# Patient Record
Sex: FEMALE | Race: WHITE | NOT HISPANIC OR LATINO | URBAN - METROPOLITAN AREA
[De-identification: names, ages, dates, MRNs, and addresses within clinical notes are randomized per-mention and may not be internally consistent; named-entity substitution may affect disease eponyms.]

---

## 2021-09-12 ENCOUNTER — INPATIENT (INPATIENT)
Facility: HOSPITAL | Age: 81
LOS: 0 days | Discharge: HOME | DRG: 641 | End: 2021-09-13
Attending: INTERNAL MEDICINE | Admitting: INTERNAL MEDICINE
Payer: COMMERCIAL

## 2021-09-12 VITALS
SYSTOLIC BLOOD PRESSURE: 110 MMHG | RESPIRATION RATE: 17 BRPM | OXYGEN SATURATION: 98 % | WEIGHT: 99.21 LBS | DIASTOLIC BLOOD PRESSURE: 44 MMHG | HEART RATE: 137 BPM

## 2021-09-12 DIAGNOSIS — R55 SYNCOPE AND COLLAPSE: ICD-10-CM

## 2021-09-12 DIAGNOSIS — Z29.9 ENCOUNTER FOR PROPHYLACTIC MEASURES, UNSPECIFIED: ICD-10-CM

## 2021-09-12 DIAGNOSIS — D47.3 ESSENTIAL (HEMORRHAGIC) THROMBOCYTHEMIA: ICD-10-CM

## 2021-09-12 DIAGNOSIS — D45 POLYCYTHEMIA VERA: ICD-10-CM

## 2021-09-12 DIAGNOSIS — E86.1 HYPOVOLEMIA: ICD-10-CM

## 2021-09-12 DIAGNOSIS — I10 ESSENTIAL (PRIMARY) HYPERTENSION: ICD-10-CM

## 2021-09-12 DIAGNOSIS — E78.5 HYPERLIPIDEMIA, UNSPECIFIED: ICD-10-CM

## 2021-09-12 DIAGNOSIS — I25.10 ATHEROSCLEROTIC HEART DISEASE OF NATIVE CORONARY ARTERY WITHOUT ANGINA PECTORIS: ICD-10-CM

## 2021-09-12 DIAGNOSIS — Z79.82 LONG TERM (CURRENT) USE OF ASPIRIN: ICD-10-CM

## 2021-09-12 DIAGNOSIS — I95.89 OTHER HYPOTENSION: ICD-10-CM

## 2021-09-12 LAB
ALBUMIN SERPL ELPH-MCNC: 4 G/DL — SIGNIFICANT CHANGE UP (ref 3.3–5)
ALP SERPL-CCNC: 112 U/L — SIGNIFICANT CHANGE UP (ref 40–120)
ALT FLD-CCNC: 10 U/L — SIGNIFICANT CHANGE UP (ref 10–45)
ANION GAP SERPL CALC-SCNC: 15 MMOL/L — SIGNIFICANT CHANGE UP (ref 5–17)
ANISOCYTOSIS BLD QL: SIGNIFICANT CHANGE UP
APPEARANCE UR: CLEAR — SIGNIFICANT CHANGE UP
APTT BLD: 26.9 SEC — LOW (ref 27.5–35.5)
AST SERPL-CCNC: 18 U/L — SIGNIFICANT CHANGE UP (ref 10–40)
BACTERIA # UR AUTO: PRESENT /HPF
BASOPHILS # BLD AUTO: 0.45 K/UL — HIGH (ref 0–0.2)
BASOPHILS NFR BLD AUTO: 1.8 % — SIGNIFICANT CHANGE UP (ref 0–2)
BILIRUB SERPL-MCNC: 0.8 MG/DL — SIGNIFICANT CHANGE UP (ref 0.2–1.2)
BILIRUB UR-MCNC: NEGATIVE — SIGNIFICANT CHANGE UP
BLD GP AB SCN SERPL QL: NEGATIVE — SIGNIFICANT CHANGE UP
BUN SERPL-MCNC: 29 MG/DL — HIGH (ref 7–23)
CALCIUM SERPL-MCNC: 8.9 MG/DL — SIGNIFICANT CHANGE UP (ref 8.4–10.5)
CHLORIDE SERPL-SCNC: 98 MMOL/L — SIGNIFICANT CHANGE UP (ref 96–108)
CK MB CFR SERPL CALC: 4.2 NG/ML — SIGNIFICANT CHANGE UP (ref 0–6.7)
CK SERPL-CCNC: 52 U/L — SIGNIFICANT CHANGE UP (ref 25–170)
CK SERPL-CCNC: 55 U/L — SIGNIFICANT CHANGE UP (ref 25–170)
CK SERPL-CCNC: 59 U/L — SIGNIFICANT CHANGE UP (ref 25–170)
CO2 SERPL-SCNC: 25 MMOL/L — SIGNIFICANT CHANGE UP (ref 22–31)
COLOR SPEC: YELLOW — SIGNIFICANT CHANGE UP
COMMENT - URINE: SIGNIFICANT CHANGE UP
CREAT SERPL-MCNC: 1.09 MG/DL — SIGNIFICANT CHANGE UP (ref 0.5–1.3)
DIFF PNL FLD: NEGATIVE — SIGNIFICANT CHANGE UP
ELLIPTOCYTES BLD QL SMEAR: SLIGHT — SIGNIFICANT CHANGE UP
EOSINOPHIL # BLD AUTO: 0 K/UL — SIGNIFICANT CHANGE UP (ref 0–0.5)
EOSINOPHIL NFR BLD AUTO: 0 % — SIGNIFICANT CHANGE UP (ref 0–6)
EPI CELLS # UR: SIGNIFICANT CHANGE UP /HPF (ref 0–5)
GIANT PLATELETS BLD QL SMEAR: PRESENT — SIGNIFICANT CHANGE UP
GLUCOSE SERPL-MCNC: 164 MG/DL — HIGH (ref 70–99)
GLUCOSE UR QL: NEGATIVE — SIGNIFICANT CHANGE UP
HCT VFR BLD CALC: 43.6 % — SIGNIFICANT CHANGE UP (ref 34.5–45)
HGB BLD-MCNC: 12.4 G/DL — SIGNIFICANT CHANGE UP (ref 11.5–15.5)
INR BLD: 1.08 — SIGNIFICANT CHANGE UP (ref 0.88–1.16)
KETONES UR-MCNC: NEGATIVE — SIGNIFICANT CHANGE UP
LEUKOCYTE ESTERASE UR-ACNC: ABNORMAL
LYMPHOCYTES # BLD AUTO: 2.47 K/UL — SIGNIFICANT CHANGE UP (ref 1–3.3)
LYMPHOCYTES # BLD AUTO: 9.8 % — LOW (ref 13–44)
MANUAL SMEAR VERIFICATION: SIGNIFICANT CHANGE UP
MCHC RBC-ENTMCNC: 19.5 PG — LOW (ref 27–34)
MCHC RBC-ENTMCNC: 28.4 GM/DL — LOW (ref 32–36)
MCV RBC AUTO: 68.6 FL — LOW (ref 80–100)
METAMYELOCYTES # FLD: 0.9 % — HIGH (ref 0–0)
MICROCYTES BLD QL: SIGNIFICANT CHANGE UP
MONOCYTES # BLD AUTO: 0.23 K/UL — SIGNIFICANT CHANGE UP (ref 0–0.9)
MONOCYTES NFR BLD AUTO: 0.9 % — LOW (ref 2–14)
NEUTROPHILS # BLD AUTO: 21.4 K/UL — HIGH (ref 1.8–7.4)
NEUTROPHILS NFR BLD AUTO: 82.1 % — HIGH (ref 43–77)
NEUTS BAND # BLD: 2.7 % — SIGNIFICANT CHANGE UP (ref 0–8)
NITRITE UR-MCNC: NEGATIVE — SIGNIFICANT CHANGE UP
NT-PROBNP SERPL-SCNC: 305 PG/ML — HIGH (ref 0–300)
OVALOCYTES BLD QL SMEAR: SLIGHT — SIGNIFICANT CHANGE UP
PH UR: 6.5 — SIGNIFICANT CHANGE UP (ref 5–8)
PLAT MORPH BLD: ABNORMAL
PLATELET # BLD AUTO: 1064 K/UL — CRITICAL HIGH (ref 150–400)
PLATELET CLUMP BLD QL SMEAR: SLIGHT
POIKILOCYTOSIS BLD QL AUTO: SLIGHT — SIGNIFICANT CHANGE UP
POTASSIUM SERPL-MCNC: 3.3 MMOL/L — LOW (ref 3.5–5.3)
POTASSIUM SERPL-SCNC: 3.3 MMOL/L — LOW (ref 3.5–5.3)
PROT SERPL-MCNC: 6.4 G/DL — SIGNIFICANT CHANGE UP (ref 6–8.3)
PROT UR-MCNC: NEGATIVE MG/DL — SIGNIFICANT CHANGE UP
PROTHROM AB SERPL-ACNC: 12.9 SEC — SIGNIFICANT CHANGE UP (ref 10.6–13.6)
RBC # BLD: 6.36 M/UL — HIGH (ref 3.8–5.2)
RBC # FLD: 19.9 % — HIGH (ref 10.3–14.5)
RBC BLD AUTO: ABNORMAL
RBC CASTS # UR COMP ASSIST: < 5 /HPF — SIGNIFICANT CHANGE UP
RH IG SCN BLD-IMP: POSITIVE — SIGNIFICANT CHANGE UP
SARS-COV-2 RNA SPEC QL NAA+PROBE: NEGATIVE — SIGNIFICANT CHANGE UP
SMUDGE CELLS # BLD: PRESENT — SIGNIFICANT CHANGE UP
SODIUM SERPL-SCNC: 138 MMOL/L — SIGNIFICANT CHANGE UP (ref 135–145)
SP GR SPEC: 1.02 — SIGNIFICANT CHANGE UP (ref 1–1.03)
SPHEROCYTES BLD QL SMEAR: SIGNIFICANT CHANGE UP
TROPONIN T SERPL-MCNC: 0.01 NG/ML — SIGNIFICANT CHANGE UP (ref 0–0.01)
TROPONIN T SERPL-MCNC: 0.02 NG/ML — HIGH (ref 0–0.01)
UROBILINOGEN FLD QL: 0.2 E.U./DL — SIGNIFICANT CHANGE UP
VARIANT LYMPHS # BLD: 1.8 % — SIGNIFICANT CHANGE UP (ref 0–6)
WBC # BLD: 25.24 K/UL — HIGH (ref 3.8–10.5)
WBC # FLD AUTO: 25.24 K/UL — HIGH (ref 3.8–10.5)
WBC UR QL: > 10 /HPF

## 2021-09-12 PROCEDURE — 70450 CT HEAD/BRAIN W/O DYE: CPT | Mod: 26,ME

## 2021-09-12 PROCEDURE — 71045 X-RAY EXAM CHEST 1 VIEW: CPT | Mod: 26,59

## 2021-09-12 PROCEDURE — 93010 ELECTROCARDIOGRAM REPORT: CPT

## 2021-09-12 PROCEDURE — 71045 X-RAY EXAM CHEST 1 VIEW: CPT | Mod: 26

## 2021-09-12 PROCEDURE — 93308 TTE F-UP OR LMTD: CPT | Mod: 26

## 2021-09-12 PROCEDURE — G1004: CPT

## 2021-09-12 PROCEDURE — 99291 CRITICAL CARE FIRST HOUR: CPT | Mod: 25

## 2021-09-12 PROCEDURE — 93321 DOPPLER ECHO F-UP/LMTD STD: CPT | Mod: 26

## 2021-09-12 RX ORDER — EZETIMIBE 10 MG/1
1 TABLET ORAL
Qty: 0 | Refills: 0 | DISCHARGE

## 2021-09-12 RX ORDER — LISINOPRIL 2.5 MG/1
1 TABLET ORAL
Qty: 0 | Refills: 0 | DISCHARGE

## 2021-09-12 RX ORDER — SODIUM CHLORIDE 9 MG/ML
1000 INJECTION INTRAMUSCULAR; INTRAVENOUS; SUBCUTANEOUS ONCE
Refills: 0 | Status: COMPLETED | OUTPATIENT
Start: 2021-09-12 | End: 2021-09-12

## 2021-09-12 RX ORDER — HYDROXYUREA 500 MG/1
500 CAPSULE ORAL DAILY
Refills: 0 | Status: DISCONTINUED | OUTPATIENT
Start: 2021-09-12 | End: 2021-09-12

## 2021-09-12 RX ORDER — LISINOPRIL 2.5 MG/1
10 TABLET ORAL DAILY
Refills: 0 | Status: DISCONTINUED | OUTPATIENT
Start: 2021-09-12 | End: 2021-09-13

## 2021-09-12 RX ORDER — LISINOPRIL 2.5 MG/1
0 TABLET ORAL
Qty: 0 | Refills: 0 | DISCHARGE

## 2021-09-12 RX ORDER — HYDROXYUREA 500 MG/1
500 CAPSULE ORAL DAILY
Refills: 0 | Status: DISCONTINUED | OUTPATIENT
Start: 2021-09-12 | End: 2021-09-13

## 2021-09-12 RX ORDER — ASPIRIN/CALCIUM CARB/MAGNESIUM 324 MG
1 TABLET ORAL
Qty: 0 | Refills: 0 | DISCHARGE

## 2021-09-12 RX ORDER — HYDROCHLOROTHIAZIDE 25 MG
25 TABLET ORAL DAILY
Refills: 0 | Status: DISCONTINUED | OUTPATIENT
Start: 2021-09-12 | End: 2021-09-13

## 2021-09-12 RX ORDER — POTASSIUM CHLORIDE 20 MEQ
40 PACKET (EA) ORAL ONCE
Refills: 0 | Status: COMPLETED | OUTPATIENT
Start: 2021-09-12 | End: 2021-09-12

## 2021-09-12 RX ADMIN — Medication 40 MILLIEQUIVALENT(S): at 21:44

## 2021-09-12 RX ADMIN — SODIUM CHLORIDE 1000 MILLILITER(S): 9 INJECTION INTRAMUSCULAR; INTRAVENOUS; SUBCUTANEOUS at 16:14

## 2021-09-12 RX ADMIN — HYDROXYUREA 500 MILLIGRAM(S): 500 CAPSULE ORAL at 21:47

## 2021-09-12 NOTE — H&P ADULT - HISTORY OF PRESENT ILLNESS
A&O X4 Full Code    In terms of COVID-19;  pt. denies hx of COVID-19 and has received full, two doses, of the Pfizer COVID-19 vaccination.    82 y/o active female with PMHX of HTN, HLD and Polycythemia Vera diagnosed in 2019 at [facility], sees Hematologist, Dr. Nazario Hester, on Aspirin Ec 81mg PO daily, never phlebotomized or transfused, BIBA to [facility] ER this evening, 9/12/21, after witnessed presyncopal episode at bus stop with no LOC.     In speaking with patient, she reports being in her usual good state of health until this afternoon while walking to bus stop, after eating lunch, she felt lightheaded and weak.  Pt. attributed symptoms due to the humidity.       A&O X4 Full Code    In terms of COVID-19;  pt. denies hx of COVID-19 and has received full, two doses, of the Pfizer COVID-19 vaccination.    80 y/o active female with PMHX of HTN, HLD and Polycythemia Vera diagnosed in 2019 at [facility], sees Hematologist, Dr. Nazario Hester, on Aspirin Ec 81mg PO daily, never phlebotomized or transfused, BIBA to [facility] ER this evening, 9/12/21, after witnessed presyncopal episode at bus stop with no LOC.     In speaking with patient, she reports being in her usual good state of health until this afternoon while walking to bus stop, after eating lunch, she felt lightheaded and weak attributing her symptoms to the humidity.  According to patient, after arriving  at the bus stop,  she felt sudden  generalized weakness, palpitations and lightheadedness.  She  laid herself on the ground where she was helped by bystanders, never LOC. EMS was called, on arrival ECG performed by EMS revealing concerns for ST depression, SBP 70's in which patient received 300cc of fluids.  Pt. arrived to [facility] ER on a drip, dobutamine in which was discontinued.   On arrival to [facility] ER, ECG reveals no ST changes, NSR @99bpm with non specific ST-T wave changes and neg Troponin.   Pt. admits to similar near syncopal episode in 2019 when she was first diagnosed with Polycythemia Vera.   She reports seeing her Hematologist, Dr. Nazario Hester, affiliated with [facility] one month ago and as told he was concerned due to a significant rise in her platelets and he would need to start new medication on next visit, September 27, 2021, if there was not a decrease in platelets.  Pt. denies fever, chills, HA, chest pain, SOB, myalgia, dizziness, diaphoresis, palpitations, abdominal discomfort and n/v/d.    In ER ECG reveals NSR@99bpm with non specific ST-T wave changes, Troponin neg X1, WBC 25.4, Neutrophil 87.1, H/H 12.4/43.6, Platelets 1064, K+ 3.3 (replenished with Potassium Chloride 40mEq PO X1) and COVID-19 non detected.  CT of head without contrast reveals no acute hemorrhage or infarct.  Hematology was consulted, Dr. Garcia evaluated patient and started patient on Hydroxyurea 500mg PO daily, and recommended type and screen, no Aspirin 2/2 to extreme thrombocytosis, rule out acquired von Willebrand disease.  He also recommends contacting her Hematologist, Dr. Nazario Hester, for collateral information.    In light of patient's risk factors, prior ECG changes and near syncope pt. is admitted to [facility] for further monitoring.

## 2021-09-12 NOTE — ED ADULT NURSE REASSESSMENT NOTE - NS ED NURSE REASSESS COMMENT FT1
Patient requesting to drink ginger ale.  Confirmed with Dr. Dea AC for patient to drink at this time.  Ginger ale/crackers given, patient informed of plan for admission.  All questions answered.

## 2021-09-12 NOTE — H&P ADULT - NSICDXPASTMEDICALHX_GEN_ALL_CORE_FT
PAST MEDICAL HISTORY:  H/O polycythemia vera     Hyperlipidemia     Hypertension     Near syncope

## 2021-09-12 NOTE — H&P ADULT - PROBLEM SELECTOR PLAN 1
Telemetry, ECG, serial CE, Echocardiogram in AM.   Orthostatics.  F/U labs, UA, Type and Screen ordered, TSH, von Willebrand disease lab.  Pt. reports similar episode 2019 when she was first diagnosed with Polycythemia vera at [facility], never LOC.   NPO for cardiac workup, discuss with Dr. Bullard in AM.

## 2021-09-12 NOTE — ED ADULT TRIAGE NOTE - OTHER COMPLAINTS
S/P Syncopal episode. pt found to have low BP. arrives with dopamine infusing from ems. fs 180. pt diaphoretic.

## 2021-09-12 NOTE — ED PROVIDER NOTE - CRITICAL CARE ATTENDING CONTRIBUTION TO CARE
stat Cards consult- re STEMI on intial ekg  given thrombocytosis- stat d/w Heme- consider hydroxyurea

## 2021-09-12 NOTE — H&P ADULT - PROBLEM SELECTOR PLAN 2
Extreme Thrombocytosis, Platelet 1064, Hematology, Dr. Garcia, evaluated pt. in ER and started her on Hydroxyurea 500mg PO daily.  Dr. Garcia also recommends to keep active Type and Screen, f/u von Willebrand disease activity level, no aspirin.  Please call patient's Hematologist, Dr. Nazario Hester, affiliated with [facility] for collateral information.  Pt. has appointment with her Hematologist, Dr. Nazario Hester on September 27, 2021.  Monitor CBC.

## 2021-09-12 NOTE — CONSULT NOTE ADULT - ATTENDING COMMENTS
Patient seen and reviewed with fellow  Hx of MPD, diagnosed in 2019  Followed by Dr. Nazario Hester at [facility], last seen in 7/21  Has had phlebotomy in the past, last was in 2020  No recent phlebotomy and is currently microcytic hypochromic  Has been taking ASA daily  Now presents with syncope and platelet count >1 million  VW panel pending  Cardiac work up is ongoing  Has begun cytoreduction with hydrea 500mg  Will need records from [facility]    Sweta Man MD

## 2021-09-12 NOTE — H&P ADULT - NSHPPHYSICALEXAM_GEN_ALL_CORE
T(C): 36.5 (09-12-21 @ 16:10), Max: 36.5 (09-12-21 @ 16:10)  HR: 78 (09-12-21 @ 21:01) (78 - 137)  BP: 129/62 (09-12-21 @ 21:01) (99/48 - 152/67)  RR: 18 (09-12-21 @ 21:01) (16 - 19)  SpO2: 96% (09-12-21 @ 21:01) (88% - 99%)  Wt(kg): --    Appearance: Normal	  HEENT:   Normal oral mucosa, PERRL, EOMI	  Neck: Supple,  - JVD; No Carotid Bruit and 2+ pulses B/L  Cardiovascular: Normal S1 S2, No JVD, No murmurs  Respiratory: Lungs clear to auscultation/No Rales, Rhonchi, Wheezing	  Gastrointestinal:  Soft, Non-tender, + BS	  Skin: No rashes, No ecchymoses, No cyanosis  Extremities: Normal range of motion, No clubbing, cyanosis or edema  Vascular: Femoral pulses 2+ b/l without bruit, DP 2+ b/l, PT 1+ b/l  Neurologic: Non-focal  Psychiatry: A & O x 4, Mood & affect appropriate

## 2021-09-12 NOTE — CONSULT NOTE ADULT - ASSESSMENT
82 yo F with essential hypertension and polycythemia vera diagnosed in 2019 at [facility], sees Hematologist at [facility] (Dr. Gonzalez), on Aspirin 81 mg once daily, never phlebotomized or transfused, presents with witnessed presyncopal episode at a bus stop. She had last seen Dr. Gonzalez one month ago who told her that she only needed to continue with Aspirin. On presentation, her platelets was found to be in over 1000 K/uL. CT Head non-contrast was negative for hemorrhage or stroke. EKG was concerning for ST depression, with normal troponin. Hematology consulted for management of extreme thrombocytosis.    #) DIAGNOSIS  - Polycythemia vera [high risk features, age > 60, possible vasogenic event: cardiac]  - Extreme thrombocytosis, rule-out acquired von Willebrand disease    #) PLAN  - Hold off on Aspirin for now unless indicated by Cardiology team as extreme thrombocytosis (i.e. platelets > 1000K/uL) can paradoxically increase risk of bleeding due to acquired von Willebrand disease  - Send von Willebrand activity level  - Follow-up with Cardiology regarding abnormal EKG. Pending 2D Echo, orthostatic testing   - Start on Hydroxyurea 15 mg/kg (675 mg QD) for cytoreduction.  - Monitor CBC with differential once daily  - Maintain active T+S   - Primary team to obtain collateral from Hematologist (Dr. Gonzalez) at [facility] tomorrow. Will need last CBC and most recent Hematology note.     To discuss with Dr. Man   80 yo F with essential hypertension and polycythemia vera diagnosed in 2019 at [facility], sees Hematologist at [facility] (Dr. Gonzalez), on Aspirin 81 mg once daily, phlebotomized in 2019/2020, never transfused, presents with witnessed presyncopal episode at a bus stop. She had last seen Dr. Gonzalez one month ago who told her that she only needed to continue with Aspirin. On presentation, her platelets was found to be in over 1000 K/uL. CT Head non-contrast was negative for hemorrhage or stroke. EKG was concerning for ST depression, with normal troponin. Hematology consulted for management of extreme thrombocytosis.    #) DIAGNOSIS  - Polycythemia vera [high risk features, age > 60, possible vasogenic event: cardiac]  - Extreme thrombocytosis, rule-out acquired von Willebrand disease  - Basophilia with leukocytosis    #) PLAN  - Hold off on Aspirin for now unless indicated by Cardiology team as extreme thrombocytosis (i.e. platelets > 1000K/uL) can paradoxically increase risk of bleeding due to acquired von Willebrand disease  - Send von Willebrand activity level  - Follow-up with Cardiology regarding abnormal EKG. Pending 2D Echo, orthostatic testing   - Start on Hydroxyurea 15 mg/kg (500 mg QD) for cytoreduction.  - Monitor CBC with differential once daily  - Maintain active T+S   - Primary team to obtain collateral from Hematologist (Dr. Gonzalez) at [facility] tomorrow. Will need last CBC and most recent Hematology note.   - Send peripheral flow cytometry given unexplained leukocytosis and basophilia. Primary team to assess for infection.     Discussed with Dr. Man   80 yo F with essential hypertension and polycythemia vera diagnosed in 2019 at [facility], sees Hematologist at [facility] (Dr. Gonzalez), on Aspirin 81 mg once daily, phlebotomized in 2019/2020, never transfused, presents with witnessed presyncopal episode at a bus stop. She had last seen Dr. Gonzalez one month ago who told her that she only needed to continue with Aspirin. On presentation, her platelets was found to be in over 1000 K/uL. CT Head non-contrast was negative for hemorrhage or stroke. EKG was concerning for ST depression, with normal troponin. Hematology consulted for management of extreme thrombocytosis.    #) DIAGNOSIS  - Polycythemia vera [high risk features, age > 60, possible vasogenic event: cardiac]  - Extreme thrombocytosis, rule-out acquired von Willebrand disease  - Basophilia with leukocytosis    #) PLAN  - Hold off on Aspirin for now unless indicated by Cardiology team as extreme thrombocytosis (i.e. platelets > 1000K/uL) can paradoxically increase risk of bleeding due to acquired von Willebrand disease  - Send von Willebrand activity level  - Follow-up with Cardiology regarding abnormal EKG. Pending 2D Echo, orthostatic testing   - Start on Hydroxyurea 15 mg/kg (500 mg QD) for cytoreduction.  - Monitor CBC with differential once daily  - Maintain active T+S   - Primary team to obtain collateral from Hematologist (Dr. Nazario Hester) at [facility] tomorrow. Will need last CBC and most recent Hematology note.   - Send peripheral flow cytometry given unexplained leukocytosis and basophilia. Primary team to assess for infection.     Discussed with Dr. Man

## 2021-09-12 NOTE — ED ADULT NURSE REASSESSMENT NOTE - NS ED NURSE REASSESS COMMENT FT1
CXR done, patient reports "I feel very cold, but I feel a lot better than I was."  Warm blankets provided.

## 2021-09-12 NOTE — ED ADULT NURSE NOTE - OBJECTIVE STATEMENT
Patient arrives via EMS report stating bystanders witnessed patient have a syncopal episode on the sidewalk.  Per EMS patient found to be hypotensive 60s/40s, janett wrist IV access placed, patient given dopamine IV, upon arrival to ED patient awake and alert, evaluated by Dr. Malin.  STAT EKG completed, cardiac monitor applied, labs drawn and sent.  Patient eyes noted to be red.  Patient says she was out to lunch with friends, had omelette, toast, tea, and water.  Patient says she was walking home, had gone about 10 blocks and realized it was too humid to keep walking so she was waiting at the bus stop when she passed out.  No obvious injuries noted.  Patient states she takes baby ASA, lisinopril & HCTZ daily.  Denies chest pain or SOB now & prior to syncope.  Patient reports "this has never happened to me before."

## 2021-09-12 NOTE — H&P ADULT - NSHPOUTPATIENTPROVIDERS_GEN_ALL_CORE
Dr. Dom Mendoza [phone]; Hematologist, Dr. Nazario Hester Affiliated with [facility] [phone]  Pt. reports receiving all her medical care at [facility].

## 2021-09-12 NOTE — ED PROVIDER NOTE - CLINICAL SUMMARY MEDICAL DECISION MAKING FREE TEXT BOX
px hypotensive initially- w ekg concerning for STEMI_ stat cards consult- they will  admit- but no intervention - re elev plt's  - nml trop  repeat ekg- normalized- d/w Heme/Onc- may start hydroxyurea- P d/w their attg

## 2021-09-12 NOTE — H&P ADULT - ASSESSMENT
80 y/o active female with PMHX of HTN, HLD and Polycythemia Vera diagnosed in 2019 at [facility], sees Hematologist, Dr. Nazario Hester, on Aspirin Ec 81mg PO daily, never phlebotomized or transfused, BIBA to [facility] ER this evening, 9/12/21, after witnessed presyncopal episode at bus stop with no LOC.

## 2021-09-12 NOTE — CONSULT NOTE ADULT - SUBJECTIVE AND OBJECTIVE BOX
LENGTH OF HOSPITAL STAY: 0    CHIEF COMPLAINT: Syncope    HISTORY OF PRESENTING ILLNESS:   82 yo F with essential hypertension and polycythemia vera diagnosed in 2019 at [facility], sees Hematologist at [facility] (Dr. Gonzalez), on Aspirin 81 mg once daily, never phlebotomized or transfused, presents with witnessed presyncopal episode at a bus stop. She felt lightheaded and had palpitations and laid herself on the floor where she was helped by bystanders. She had last seen Dr. Gonzalez one month ago who told her that she only needed to continue with Aspirin. On presentation, her platelets was found to be in over 1000 K/uL. CT Head non-contrast was negative for hemorrhage or stroke. EKG was concerning for ST depression, with normal troponin. She will be admitted to cardiology for further monitoring.      PAST MEDICAL & SURGICAL HISTORY:  Essential hypertension  Polycythemia vera       ALLERGIES:  No Known Allergies    MEDICATIONS:  STANDING MEDICATIONS    PRN MEDICATIONS    VITALS:   T(F): 97.7  HR: 84  BP: 152/67  RR: 16  SpO2: 99%    LABS:                        12.4   25.24 )-----------( 1064     ( 12 Sep 2021 16:24 )             43.6     09-12    138  |  98  |  29<H>  ----------------------------<  164<H>  3.3<L>   |  25  |  1.09    Ca    8.9      12 Sep 2021 16:24    TPro  6.4  /  Alb  4.0  /  TBili  0.8  /  DBili  x   /  AST  18  /  ALT  10  /  AlkPhos  112  09-12    PT/INR - ( 12 Sep 2021 16:24 )   PT: 12.9 sec;   INR: 1.08        PTT - ( 12 Sep 2021 16:24 )  PTT:26.9 sec    Creatine Kinase, Serum: 52 U/L (09-12-21 @ 16:24)  Troponin T, Serum: 0.01 ng/mL (09-12-21 @ 16:24)    CARDIAC MARKERS ( 12 Sep 2021 16:24 )  x     / 0.01 ng/mL / 52 U/L / x     / 3.1 ng/mL    RADIOLOGY:  < from: CT Head No Cont (09.12.21 @ 17:48) >  IMPRESSION:  No CTevidence of acute intracranial hemorrhage, brain edema, mass effect or acute territorial infarct.    < end of copied text >    < from: Xray Chest 1 View- PORTABLE-Urgent (09.12.21 @ 16:44) >  Unremarkable except for vascular calcification thoracic aorta. Lungs clear. No infiltrate pleural effusion or pneumothorax. Unremarkable cardiac silhouette. No acute bone abnormality.    < end of copied text >    PHYSICAL EXAM:  GEN: No acute distress  HEENT: NCAT  LUNGS: Clear to auscultation bilaterally   HEART: S1/S2 present. RRR.   ABD: Soft, non-tender, non-distended. Bowel sounds present  EXT: No pitting edema  NEURO: AAOX3     LENGTH OF HOSPITAL STAY: 0    CHIEF COMPLAINT: Syncope    HISTORY OF PRESENTING ILLNESS:   82 yo F with essential hypertension and polycythemia vera diagnosed in 2019 at [facility], sees Hematologist at [facility] (Dr. Gonzalez), on Aspirin 81 mg once daily, phlebotomized in 2019/2020, never transfused, presents with witnessed presyncopal episode at a bus stop. She felt lightheaded and had palpitations and laid herself on the floor where she was helped by bystanders. She had last seen Dr. Gonzalez one month ago who told her that she only needed to continue with Aspirin. On presentation, her platelets was found to be in over 1000 K/uL. CT Head non-contrast was negative for hemorrhage or stroke. EKG was concerning for ST depression, with normal troponin. She will be admitted to cardiology for further monitoring.      PAST MEDICAL & SURGICAL HISTORY:  Essential hypertension  Polycythemia vera       ALLERGIES:  No Known Allergies    MEDICATIONS:  STANDING MEDICATIONS    PRN MEDICATIONS    VITALS:   T(F): 97.7  HR: 84  BP: 152/67  RR: 16  SpO2: 99%    LABS:                        12.4   25.24 )-----------( 1064     ( 12 Sep 2021 16:24 )             43.6     09-12    138  |  98  |  29<H>  ----------------------------<  164<H>  3.3<L>   |  25  |  1.09    Ca    8.9      12 Sep 2021 16:24    TPro  6.4  /  Alb  4.0  /  TBili  0.8  /  DBili  x   /  AST  18  /  ALT  10  /  AlkPhos  112  09-12    PT/INR - ( 12 Sep 2021 16:24 )   PT: 12.9 sec;   INR: 1.08        PTT - ( 12 Sep 2021 16:24 )  PTT:26.9 sec    Creatine Kinase, Serum: 52 U/L (09-12-21 @ 16:24)  Troponin T, Serum: 0.01 ng/mL (09-12-21 @ 16:24)    CARDIAC MARKERS ( 12 Sep 2021 16:24 )  x     / 0.01 ng/mL / 52 U/L / x     / 3.1 ng/mL    RADIOLOGY:  < from: CT Head No Cont (09.12.21 @ 17:48) >  IMPRESSION:  No CTevidence of acute intracranial hemorrhage, brain edema, mass effect or acute territorial infarct.    < end of copied text >    < from: Xray Chest 1 View- PORTABLE-Urgent (09.12.21 @ 16:44) >  Unremarkable except for vascular calcification thoracic aorta. Lungs clear. No infiltrate pleural effusion or pneumothorax. Unremarkable cardiac silhouette. No acute bone abnormality.    < end of copied text >    PHYSICAL EXAM:  GEN: No acute distress  HEENT: NCAT  LUNGS: Clear to auscultation bilaterally   HEART: S1/S2 present. RRR.   ABD: Soft, non-tender, non-distended. Bowel sounds present  EXT: No pitting edema  NEURO: AAOX3

## 2021-09-12 NOTE — PROVIDER CONTACT NOTE (CRITICAL VALUE NOTIFICATION) - TEST AND RESULT REPORTED:
The patient is reassured there are no abnormal findings on her pelvic ultrasound today and no ovarian cyst is noted.  Her IUD is in proper position.  She is counseled that there can be unpredictable bleeding and spotting with the IUD even though she has had episodes of amenorrhea.  She is encouraged to follow-up for annual exam and Pap smear.   platelets 1,064

## 2021-09-12 NOTE — ED PROVIDER NOTE - OBJECTIVE STATEMENT
81 F w pmh htn, pcv 81 F w pmh htn, pcv - w syncopal episode- was walking down the street- felt very lightheaded and weak- then fell to the ground no cp/sob  no ho mi/cva  mod severity  px on asa only for pcv- was told platelets were high a month or two ago by heme - but not started on asa

## 2021-09-12 NOTE — H&P ADULT - NSHPREVIEWOFSYSTEMS_GEN_ALL_CORE
GENERAL, CONSTITUTIONAL : Admits weakness denies recent weight loss, fever, chills  EYES, VISION: denies changes in vision   EARS, NOSE, THROAT: denies hearing loss  HEART, CARDIOVASCULAR: denies chest pain, hx arrhythmia, admits to  palpitations during epsiode  RESPIRATORY: Denies cough, SOB, wheezing, PND, orthopnea  GASTROINTESTINAL: Denies abdominal pain, heartburn, bloody stool, dark tarry stool  GENITOURINARY: Denies frequent urination, urgency  MUSCULOSKELETAL denies joint pain or swelling, restricted motion, musculoskeletal pain.   SKIN & INTEGUMENTARY Denies rashes, sores, blisters, blisters, growths.  NEUROLOGICAL: Denies numbness or tingling sensations, sensation loss, burning.   PSYCHIATRIC: Denies nervousness, anxiety, depression  ENDOCRINE Denies heat or cold intolerance, excessive thirst  HEMATOLOGIC/LYMPHATIC: Patient has hx of Polycythemia vera

## 2021-09-12 NOTE — H&P ADULT - NSHPLABSRESULTS_GEN_ALL_CORE
12.4   .24 )-----------( 1064     ( 12 Sep 2021 16:24 )             43.6       09-12    138  |  98  |  29<H>  ----------------------------<  164<H>  3.3<L>   |  25  |  1.09    Ca    8.9      12 Sep 2021 16:24    TPro  6.4  /  Alb  4.0  /  TBili  0.8  /  DBili  x   /  AST  18  /  ALT  10  /  AlkPhos  112  09-12      PT/INR - ( 12 Sep 2021 16:24 )   PT: 12.9 sec;   INR: 1.08          PTT - ( 12 Sep 2021 16:24 )  PTT:26.9 sec    CARDIAC MARKERS ( 12 Sep 2021 19:56 )  x     / 0.02 ng/mL / 55 U/L / x     / 4.2 ng/mL  CARDIAC MARKERS ( 12 Sep 2021 16:24 )  x     / 0.01 ng/mL / 52 U/L / x     / 3.1 ng/mL        Urinalysis Basic - ( 12 Sep 2021 19:56 )    Color: Yellow / Appearance: Clear / S.020 / pH: x  Gluc: x / Ketone: NEGATIVE  / Bili: Negative / Urobili: 0.2 E.U./dL   Blood: x / Protein: NEGATIVE mg/dL / Nitrite: NEGATIVE   Leuk Esterase: Small / RBC: < 5 /HPF / WBC > 10 /HPF   Sq Epi: x / Non Sq Epi: 0-5 /HPF / Bacteria: Present /HPF        EKG:NSR@99bpm with non specific ST-T wave changes

## 2021-09-12 NOTE — H&P ADULT - NSHPSOCIALHISTORY_GEN_ALL_CORE
Single, active, lives alone.  Denies Tobacco, EtOH and illicit drug use. Pt. reports receiving all her medical care at [facility].

## 2021-09-12 NOTE — H&P ADULT - REASON FOR ADMISSION
Near Syncope  Pt. reports feeling  weak with one episode of palpitations and lightheadedness before slow fall no LOC

## 2021-09-13 VITALS — TEMPERATURE: 98 F

## 2021-09-13 LAB
ALBUMIN SERPL ELPH-MCNC: 3.7 G/DL — SIGNIFICANT CHANGE UP (ref 3.3–5)
ALP SERPL-CCNC: 90 U/L — SIGNIFICANT CHANGE UP (ref 40–120)
ALT FLD-CCNC: 16 U/L — SIGNIFICANT CHANGE UP (ref 10–45)
ANION GAP SERPL CALC-SCNC: 9 MMOL/L — SIGNIFICANT CHANGE UP (ref 5–17)
ANISOCYTOSIS BLD QL: SIGNIFICANT CHANGE UP
APTT BLD: 28.1 SEC — SIGNIFICANT CHANGE UP (ref 27.5–35.5)
AST SERPL-CCNC: 23 U/L — SIGNIFICANT CHANGE UP (ref 10–40)
BASOPHILS # BLD AUTO: 0.78 K/UL — HIGH (ref 0–0.2)
BASOPHILS NFR BLD AUTO: 2.6 % — HIGH (ref 0–2)
BILIRUB DIRECT SERPL-MCNC: <0.2 MG/DL — SIGNIFICANT CHANGE UP (ref 0–0.2)
BILIRUB INDIRECT FLD-MCNC: SIGNIFICANT CHANGE UP MG/DL (ref 0.2–1)
BILIRUB SERPL-MCNC: 0.7 MG/DL — SIGNIFICANT CHANGE UP (ref 0.2–1.2)
BUN SERPL-MCNC: 22 MG/DL — SIGNIFICANT CHANGE UP (ref 7–23)
BURR CELLS BLD QL SMEAR: PRESENT — SIGNIFICANT CHANGE UP
CALCIUM SERPL-MCNC: 8.2 MG/DL — LOW (ref 8.4–10.5)
CHLORIDE SERPL-SCNC: 105 MMOL/L — SIGNIFICANT CHANGE UP (ref 96–108)
CHOLEST SERPL-MCNC: 108 MG/DL — SIGNIFICANT CHANGE UP
CK MB CFR SERPL CALC: 4.8 NG/ML — SIGNIFICANT CHANGE UP (ref 0–6.7)
CK SERPL-CCNC: 42 U/L — SIGNIFICANT CHANGE UP (ref 25–170)
CO2 SERPL-SCNC: 22 MMOL/L — SIGNIFICANT CHANGE UP (ref 22–31)
COVID-19 SPIKE DOMAIN AB INTERP: POSITIVE
COVID-19 SPIKE DOMAIN ANTIBODY RESULT: >250 U/ML — HIGH
CREAT SERPL-MCNC: 0.96 MG/DL — SIGNIFICANT CHANGE UP (ref 0.5–1.3)
EOSINOPHIL # BLD AUTO: 0.27 K/UL — SIGNIFICANT CHANGE UP (ref 0–0.5)
EOSINOPHIL NFR BLD AUTO: 0.9 % — SIGNIFICANT CHANGE UP (ref 0–6)
FACT VIII ACT/NOR PPP: 325 % — HIGH (ref 51–138)
FERRITIN SERPL-MCNC: 18 NG/ML — SIGNIFICANT CHANGE UP (ref 15–150)
GIANT PLATELETS BLD QL SMEAR: PRESENT — SIGNIFICANT CHANGE UP
GLUCOSE SERPL-MCNC: 101 MG/DL — HIGH (ref 70–99)
HCT VFR BLD CALC: 39.8 % — SIGNIFICANT CHANGE UP (ref 34.5–45)
HDLC SERPL-MCNC: 32 MG/DL — LOW
HGB BLD-MCNC: 11.2 G/DL — LOW (ref 11.5–15.5)
HYPOCHROMIA BLD QL: SLIGHT — SIGNIFICANT CHANGE UP
INR BLD: 1.27 — HIGH (ref 0.88–1.16)
IRON SATN MFR SERPL: 20 UG/DL — LOW (ref 30–160)
IRON SATN MFR SERPL: 6 % — LOW (ref 14–50)
LIPID PNL WITH DIRECT LDL SERPL: 58 MG/DL — SIGNIFICANT CHANGE UP
LYMPHOCYTES # BLD AUTO: 1.59 K/UL — SIGNIFICANT CHANGE UP (ref 1–3.3)
LYMPHOCYTES # BLD AUTO: 5.3 % — LOW (ref 13–44)
MAGNESIUM SERPL-MCNC: 1.6 MG/DL — SIGNIFICANT CHANGE UP (ref 1.6–2.6)
MANUAL SMEAR VERIFICATION: SIGNIFICANT CHANGE UP
MCHC RBC-ENTMCNC: 19.8 PG — LOW (ref 27–34)
MCHC RBC-ENTMCNC: 28.1 GM/DL — LOW (ref 32–36)
MCV RBC AUTO: 70.2 FL — LOW (ref 80–100)
MICROCYTES BLD QL: SIGNIFICANT CHANGE UP
MONOCYTES # BLD AUTO: 0.78 K/UL — SIGNIFICANT CHANGE UP (ref 0–0.9)
MONOCYTES NFR BLD AUTO: 2.6 % — SIGNIFICANT CHANGE UP (ref 2–14)
NEUTROPHILS # BLD AUTO: 26.55 K/UL — HIGH (ref 1.8–7.4)
NEUTROPHILS NFR BLD AUTO: 88.6 % — HIGH (ref 43–77)
NON HDL CHOLESTEROL: 76 MG/DL — SIGNIFICANT CHANGE UP
OVALOCYTES BLD QL SMEAR: SLIGHT — SIGNIFICANT CHANGE UP
PLAT MORPH BLD: ABNORMAL
PLATELET # BLD AUTO: 850 K/UL — HIGH (ref 150–400)
POIKILOCYTOSIS BLD QL AUTO: SLIGHT — SIGNIFICANT CHANGE UP
POLYCHROMASIA BLD QL SMEAR: SLIGHT — SIGNIFICANT CHANGE UP
POTASSIUM SERPL-MCNC: 4.2 MMOL/L — SIGNIFICANT CHANGE UP (ref 3.5–5.3)
POTASSIUM SERPL-SCNC: 4.2 MMOL/L — SIGNIFICANT CHANGE UP (ref 3.5–5.3)
PROT SERPL-MCNC: 5.5 G/DL — LOW (ref 6–8.3)
PROTHROM AB SERPL-ACNC: 15.1 SEC — HIGH (ref 10.6–13.6)
RBC # BLD: 5.67 M/UL — HIGH (ref 3.8–5.2)
RBC # FLD: 19.4 % — HIGH (ref 10.3–14.5)
RBC BLD AUTO: ABNORMAL
SARS-COV-2 IGG+IGM SERPL QL IA: >250 U/ML — HIGH
SARS-COV-2 IGG+IGM SERPL QL IA: POSITIVE
SODIUM SERPL-SCNC: 136 MMOL/L — SIGNIFICANT CHANGE UP (ref 135–145)
TARGETS BLD QL SMEAR: SLIGHT — SIGNIFICANT CHANGE UP
TIBC SERPL-MCNC: 324 UG/DL — SIGNIFICANT CHANGE UP (ref 220–430)
TRANSFERRIN SERPL-MCNC: 277 MG/DL — SIGNIFICANT CHANGE UP (ref 200–360)
TRIGL SERPL-MCNC: 88 MG/DL — SIGNIFICANT CHANGE UP
TROPONIN T SERPL-MCNC: 0.01 NG/ML — SIGNIFICANT CHANGE UP (ref 0–0.01)
TSH SERPL-MCNC: 2.22 UIU/ML — SIGNIFICANT CHANGE UP (ref 0.27–4.2)
UIBC SERPL-MCNC: 304 UG/DL — SIGNIFICANT CHANGE UP (ref 110–370)
VWF AG ACT/NOR PPP IA: 586 % — HIGH (ref 63–170)
WBC # BLD: 29.97 K/UL — HIGH (ref 3.8–10.5)
WBC # FLD AUTO: 29.97 K/UL — HIGH (ref 3.8–10.5)

## 2021-09-13 PROCEDURE — 93005 ELECTROCARDIOGRAM TRACING: CPT

## 2021-09-13 PROCEDURE — 93306 TTE W/DOPPLER COMPLETE: CPT | Mod: 26

## 2021-09-13 PROCEDURE — 86850 RBC ANTIBODY SCREEN: CPT

## 2021-09-13 PROCEDURE — 97161 PT EVAL LOW COMPLEX 20 MIN: CPT

## 2021-09-13 PROCEDURE — 82550 ASSAY OF CK (CPK): CPT

## 2021-09-13 PROCEDURE — 99239 HOSP IP/OBS DSCHRG MGMT >30: CPT

## 2021-09-13 PROCEDURE — G1004: CPT

## 2021-09-13 PROCEDURE — 75574 CT ANGIO HRT W/3D IMAGE: CPT

## 2021-09-13 PROCEDURE — 83735 ASSAY OF MAGNESIUM: CPT

## 2021-09-13 PROCEDURE — 85610 PROTHROMBIN TIME: CPT

## 2021-09-13 PROCEDURE — 82248 BILIRUBIN DIRECT: CPT

## 2021-09-13 PROCEDURE — 85246 CLOT FACTOR VIII VW ANTIGEN: CPT

## 2021-09-13 PROCEDURE — 36415 COLL VENOUS BLD VENIPUNCTURE: CPT

## 2021-09-13 PROCEDURE — 93010 ELECTROCARDIOGRAM REPORT: CPT

## 2021-09-13 PROCEDURE — 86900 BLOOD TYPING SEROLOGIC ABO: CPT

## 2021-09-13 PROCEDURE — 93306 TTE W/DOPPLER COMPLETE: CPT

## 2021-09-13 PROCEDURE — 80053 COMPREHEN METABOLIC PANEL: CPT

## 2021-09-13 PROCEDURE — 99291 CRITICAL CARE FIRST HOUR: CPT | Mod: 25

## 2021-09-13 PROCEDURE — 86769 SARS-COV-2 COVID-19 ANTIBODY: CPT

## 2021-09-13 PROCEDURE — 86901 BLOOD TYPING SEROLOGIC RH(D): CPT

## 2021-09-13 PROCEDURE — 84484 ASSAY OF TROPONIN QUANT: CPT

## 2021-09-13 PROCEDURE — 83540 ASSAY OF IRON: CPT

## 2021-09-13 PROCEDURE — 83550 IRON BINDING TEST: CPT

## 2021-09-13 PROCEDURE — 85730 THROMBOPLASTIN TIME PARTIAL: CPT

## 2021-09-13 PROCEDURE — 70450 CT HEAD/BRAIN W/O DYE: CPT | Mod: ME

## 2021-09-13 PROCEDURE — 36000 PLACE NEEDLE IN VEIN: CPT

## 2021-09-13 PROCEDURE — 82553 CREATINE MB FRACTION: CPT

## 2021-09-13 PROCEDURE — 82728 ASSAY OF FERRITIN: CPT

## 2021-09-13 PROCEDURE — 84466 ASSAY OF TRANSFERRIN: CPT

## 2021-09-13 PROCEDURE — 71045 X-RAY EXAM CHEST 1 VIEW: CPT

## 2021-09-13 PROCEDURE — 84443 ASSAY THYROID STIM HORMONE: CPT

## 2021-09-13 PROCEDURE — 75574 CT ANGIO HRT W/3D IMAGE: CPT | Mod: 26

## 2021-09-13 PROCEDURE — 80061 LIPID PANEL: CPT

## 2021-09-13 PROCEDURE — 85025 COMPLETE CBC W/AUTO DIFF WBC: CPT

## 2021-09-13 RX ORDER — ASPIRIN/CALCIUM CARB/MAGNESIUM 324 MG
81 TABLET ORAL DAILY
Refills: 0 | Status: DISCONTINUED | OUTPATIENT
Start: 2021-09-13 | End: 2021-09-13

## 2021-09-13 RX ORDER — INFLUENZA VIRUS VACCINE 15; 15; 15; 15 UG/.5ML; UG/.5ML; UG/.5ML; UG/.5ML
0.5 SUSPENSION INTRAMUSCULAR ONCE
Refills: 0 | Status: DISCONTINUED | OUTPATIENT
Start: 2021-09-13 | End: 2021-09-13

## 2021-09-13 RX ORDER — SODIUM CHLORIDE 9 MG/ML
500 INJECTION INTRAMUSCULAR; INTRAVENOUS; SUBCUTANEOUS ONCE
Refills: 0 | Status: COMPLETED | OUTPATIENT
Start: 2021-09-13 | End: 2021-09-13

## 2021-09-13 RX ORDER — HYDROXYUREA 500 MG/1
1 CAPSULE ORAL
Qty: 30 | Refills: 0
Start: 2021-09-13 | End: 2021-10-12

## 2021-09-13 RX ORDER — INFLUENZA VIRUS VACCINE 15; 15; 15; 15 UG/.5ML; UG/.5ML; UG/.5ML; UG/.5ML
0.7 SUSPENSION INTRAMUSCULAR ONCE
Refills: 0 | Status: DISCONTINUED | OUTPATIENT
Start: 2021-09-13 | End: 2021-09-13

## 2021-09-13 RX ADMIN — SODIUM CHLORIDE 1000 MILLILITER(S): 9 INJECTION INTRAMUSCULAR; INTRAVENOUS; SUBCUTANEOUS at 00:55

## 2021-09-13 RX ADMIN — Medication 81 MILLIGRAM(S): at 14:06

## 2021-09-13 RX ADMIN — SODIUM CHLORIDE 1000 MILLILITER(S): 9 INJECTION INTRAMUSCULAR; INTRAVENOUS; SUBCUTANEOUS at 01:59

## 2021-09-13 NOTE — DISCHARGE NOTE PROVIDER - NSDCFUADDAPPT_GEN_ALL_CORE_FT
Please follow up with your Hematologist Dr. Hester on 9/20/21, his office will call you with the appointment time

## 2021-09-13 NOTE — DISCHARGE NOTE PROVIDER - HOSPITAL COURSE
***INCOMPLETE***    81Y F w/ PMHx HTN, HLD and Polycythemia Vera, was BIBEMS to [facility] ED 9/12/21 after a witnessed pre-syncopal episode [place] bus station. Pt reported feeling lightheaded, palpitations and generalized weakness and pt subsequently lowered herself to the ground w/o LOC. EMS was called and on arrival SBP 70s, EKG concerning for ST depression and pt received 300cc NS bolus. Pt was subsequently transferred to ED where initially EKG revealed _____ and repeat EKG        In speaking with patient, she reports being in her usual good state of health until this afternoon while walking to bus stop, after eating lunch, she felt lightheaded and weak attributing her symptoms to the humidity.  According to patient, after arriving  at the bus stop,  she felt sudden  generalized weakness, palpitations and lightheadedness.  She  laid herself on the ground where she was helped by bystanders, never LOC. EMS was called, on arrival ECG performed by EMS revealing concerns for ST depression, SBP 70's in which patient received 300cc of fluids.  Pt. arrived to [facility] ER on a drip, dobutamine in which was discontinued.   On arrival to [facility] ER, ECG reveals no ST changes, NSR @99bpm with non specific ST-T wave changes and neg Troponin.   Pt. admits to similar near syncopal episode in 2019 when she was first diagnosed with Polycythemia Vera.   She reports seeing her Hematologist, Dr. Nazario Hester, affiliated with [facility] one month ago and as told he was concerned due to a significant rise in her platelets and he would need to start new medication on next visit, September 27, 2021, if there was not a decrease in platelets.  Pt. denies fever, chills, HA, chest pain, SOB, myalgia, dizziness, diaphoresis, palpitations, abdominal discomfort and n/v/d.    In ER ECG reveals NSR@99bpm with non specific ST-T wave changes, Troponin neg X1, WBC 25.4, Neutrophil 87.1, H/H 12.4/43.6, Platelets 1064, K+ 3.3 (replenished with Potassium Chloride 40mEq PO X1) and COVID-19 non detected.  CT of head without contrast reveals no acute hemorrhage or infarct.  Hematology was consulted, Dr. Garcia evaluated patient and started patient on Hydroxyurea 500mg PO daily, and recommended type and screen, no Aspirin 2/2 to extreme thrombocytosis, rule out acquired von Willebrand disease.  He also recommends contacting her Hematologist, Dr. Nazario Hester, for collateral information.    In light of patient's risk factors, prior ECG changes and near syncope pt. is admitted to [email or web address] Cardiology for further monitoring.   ***INCOMPLETE***    81Y F w/ PMHx HTN, HLD and Polycythemia Vera, was BIBEMS to [facility] ED 9/12/21 after a witnessed pre-syncopal episode while walking to bus station. Pt reported feeling lightheaded, palpitations and generalized weakness and pt subsequently lowered herself to the ground w/o LOC. EMS was called and on arrival SBP 70s, EKG concerning for ST depression and pt received 300cc NS bolus. Pt was subsequently transferred to ED where initially EKG revealed SR w/ ST depressions in I, II, AVL, AVF, V2-V6; however repeat EKG revealed NSR w/ no ST changes. Pt was admitted to cardiac telemetry for further syncope w/u. VS remained stable, orthostatics negative, Trop negative x 3, EKG remained non ischemic, Echo revealed ____. CCTA revealed Ca score 185, minimal stenosis in prox/mid LAD, minimal stenosis of prox/mid LCx, minimal stenosis for prox/mid RCA. Heme-Onc consulted for PLT 1064, WBC 25.24 and h/o Polycythemia Vera, pt was initiated on Hydroxyurea 500mg QD w/ improvement of , WBC 29.97. Collaterals were obtained from pts outpt Hematologist which confirmed that CBC values were at pts baseline, and pt is to c/w Hydroxyurea and f/u on 9/20/21 for further management. Pt seen and examined at bedside this AM without any complaints or events overnight, VSS, labs and telemetry reviewed and pt stable for discharge as discussed with Dr. Mcgraw. Pt has received appropriate discharge instructions, including medication regimen, and follow up with Dr. Hester on 9/20/21.    Discharge medications: ASA 81mg QD, Hydroxyurea 500mg QD, Lisinopril 10mg QD, HCTZ 25mg QD and Zetia 10mg QD. 81Y F w/ PMHx HTN, HLD and Polycythemia Vera, was BIBEMS to [facility] ED 9/12/21 after a witnessed pre-syncopal episode while walking to bus station. Pt reported feeling lightheaded, palpitations and generalized weakness and pt subsequently lowered herself to the ground w/o LOC. EMS was called and on arrival SBP 70s, EKG concerning for ST depression and pt received 300cc NS bolus. Pt was subsequently transferred to ED where initially EKG revealed SR w/ ST depressions in I, II, AVL, AVF, V2-V6; however repeat EKG revealed NSR w/ no ST changes. Pt was admitted to cardiac telemetry for further syncope w/u. VS remained stable, orthostatics negative, Trop negative x 3, EKG remained non ischemic, Echo revealed ____. CCTA revealed Ca score 185, minimal stenosis in prox/mid LAD, minimal stenosis of prox/mid LCx, minimal stenosis for prox/mid RCA. Heme-Onc consulted for PLT 1064, WBC 25.24 and h/o Polycythemia Vera, pt was initiated on Hydroxyurea 500mg QD w/ improvement of , WBC 29.97. Collaterals were obtained from pts outpt Hematologist which confirmed that CBC values were at pts baseline, and pt is to c/w Hydroxyurea and f/u on 9/20/21 for further management. Pt seen and examined at bedside this AM without any complaints or events overnight, VSS, labs and telemetry reviewed and pt stable for discharge as discussed with Dr. Mcgraw. Pt has received appropriate discharge instructions, including medication regimen, and follow up with Dr. Hester on 9/20/21.    Discharge medications: ASA 81mg QD, Hydroxyurea 500mg QD, Lisinopril 10mg QD, HCTZ 25mg QD and Zetia 10mg QD. 81Y F w/ PMHx HTN, HLD and Polycythemia Vera, was BIBEMS to [facility] ED 9/12/21 after a witnessed pre-syncopal episode while walking to bus station. Pt reported feeling lightheaded, palpitations and generalized weakness and pt subsequently lowered herself to the ground w/o LOC. EMS was called and on arrival SBP 70s, EKG concerning for ST depression and pt received 300cc NS bolus. Pt was subsequently transferred to ED where initially EKG revealed SR w/ ST depressions in I, II, AVL, AVF, V2-V6; however repeat EKG revealed NSR w/ no ST changes. Pt was admitted to cardiac telemetry for further syncope w/u. VS remained stable, orthostatics negative, Trop negative x 3, EKG remained non ischemic, Echo revealed normal LVEF, G2DD, mildly dilated LA, PASP 37. CCTA revealed Ca score 185, minimal stenosis in prox/mid LAD, minimal stenosis of prox/mid LCx, minimal stenosis for prox/mid RCA. Heme-Onc consulted for PLT 1064, WBC 25.24 and h/o Polycythemia Vera, pt was initiated on Hydroxyurea 500mg QD w/ improvement of , WBC 29.97. Collaterals were obtained from pts outpt Hematologist which confirmed that CBC values were at pts baseline, and pt is to c/w Hydroxyurea and f/u on 9/20/21 for further management. Pt seen and examined at bedside this AM without any complaints or events overnight, VSS, labs and telemetry reviewed and pt stable for discharge as discussed with Dr. Mcgraw. Pt has received appropriate discharge instructions, including medication regimen, and follow up with Dr. Hester on 9/20/21.    Discharge medications: ASA 81mg QD, Hydroxyurea 500mg QD, Lisinopril 10mg QD, HCTZ 25mg QD and Zetia 10mg QD.

## 2021-09-13 NOTE — PHYSICAL THERAPY INITIAL EVALUATION ADULT - PERTINENT HX OF CURRENT PROBLEM, REHAB EVAL
82 y/o active female with PMHX of HTN, HLD and Polycythemia Vera diagnosed in 2019 at [facility], sees Hematologist, Dr. Nazario Hester, on Aspirin Ec 81mg PO daily, never phlebotomized or transfused, BIBA to [facility] ER this evening, 9/12/21, after witnessed presyncopal episode at bus stop with no LOC.  CTH neg for acute injury

## 2021-09-13 NOTE — PROGRESS NOTE ADULT - ASSESSMENT
82 yo F with essential hypertension and polycythemia vera diagnosed in 2019 at [facility], sees Hematologist at [facility] (Dr. Gonzalez), on Aspirin 81 mg once daily, phlebotomized in 2019/2020, never transfused, did not have a bone marrow biopsy, presents with witnessed presyncopal episode at a bus stop. She had last seen Dr. Gonzalez one month ago who told her that she only needed to continue with Aspirin. On presentation, her platelets was found to be in over 1000 K/uL. CT Head non-contrast was negative for hemorrhage or stroke. EKG was concerning for ST depression, with normal troponin. Hematology consulted for management of extreme thrombocytosis.    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 platelets > 1000K/uL) can paradoxically increase risk of bleeding due to acquired von Willebrand disease  - Send von Willebrand activity level  - Follow-up with Cardiology regarding abnormal EKG. Pending 2D Echo, orthostatic testing   - Start on Hydroxyurea 15 mg/kg (500 mg QD) for cytoreduction.  - Monitor CBC with differential once daily  - Maintain active T+S   - ADDENDUM: Per her [facility] Hematologist, Dr. Nazario Hester, outpatient records, last CBC on 08/04: WBC 23.5, Hb 12.4, and Platelet 806. He is in agreement with [facility]. His office will contact Ms. Alfredo for follow-up appointment likely for Monday 09/20/21.   - Send peripheral flow cytometry given unexplained leukocytosis and basophilia.     Discussed with Dr. Man 80 yo F with essential hypertension and polycythemia vera diagnosed in 2019 at [facility], sees Hematologist at [facility] (Dr. Gonzalez), on Aspirin 81 mg once daily, phlebotomized in 2019/2020, never transfused, did not have a bone marrow biopsy, presents with witnessed presyncopal episode at a bus stop. She had last seen Dr. Gonzalez one month ago who told her that she only needed to continue with Aspirin. On presentation, her platelets was found to be in over 1000 K/uL. CT Head non-contrast was negative for hemorrhage or stroke. EKG was concerning for ST depression, with normal troponin. Hematology consulted for management of extreme thrombocytosis.    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  - Monitor CBC with differential once daily  - Maintain active T+S   - ADDENDUM: Per her [facility] Hematologist, Dr. Nazario Hester, outpatient records, last CBC on 08/04: WBC 23.5, Hb 12.4, and Platelet 806. He is in agreement with [facility]. His office will contact Ms. Alfredo for follow-up appointment likely for Monday 09/20/21.   - Send peripheral flow cytometry given unexplained leukocytosis and basophilia.     Discussed with Dr. Man 80 yo F with essential hypertension and polycythemia vera diagnosed in 2019 at [facility], sees Hematologist at [facility] (Dr. Gonzalez), on Aspirin 81 mg once daily, phlebotomized in 2019/2020, never transfused, did not have a bone marrow biopsy, presents with witnessed presyncopal episode at a bus stop. She had last seen Dr. Gonzalez one month ago who told her that she only needed to continue with Aspirin. On presentation, her platelets was found to be in over 1000 K/uL. CT Head non-contrast was negative for hemorrhage or stroke. EKG was concerning for ST depression, with normal troponin. Hematology consulted for management of extreme thrombocytosis.    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  - Monitor CBC with differential once daily  - Maintain active T+S   - Send Iron studies. She may need outpatient GI work-up for relative microcytic anemia.   - ADDENDUM: Per her [facility] Hematologist, Dr. Nazario Hester, outpatient records, last CBC on 08/04: WBC 23.5, Hb 12.4, and Platelet 806. He is in agreement with [facility]. His office will contact Ms. Alfredo for follow-up appointment likely for Monday 09/20/21.   - Send peripheral flow cytometry given unexplained leukocytosis and basophilia.     Discussed with Dr. Man

## 2021-09-13 NOTE — PROGRESS NOTE ADULT - SUBJECTIVE AND OBJECTIVE BOX
LENGTH OF HOSPITAL STAY: 1d    SUBJECTIVE: No acute overnight events    HISTORY OF PRESENTING ILLNESS:   In terms of COVID-19;  pt. denies hx of COVID-19 and has received full, two doses, of the Pfizer COVID-19 vaccination.    80 y/o active female with PMHX of HTN, HLD and Polycythemia Vera diagnosed in 2019 at [facility], sees Hematologist, Dr. Nazario Hester, on Aspirin Ec 81mg PO daily, never phlebotomized or transfused, BIBA to [facility] ER this evening, 21, after witnessed presyncopal episode at bus stop with no LOC.     In speaking with patient, she reports being in her usual good state of health until this afternoon while walking to bus stop, after eating lunch, she felt lightheaded and weak attributing her symptoms to the humidity.  According to patient, after arriving  at the bus stop,  she felt sudden  generalized weakness, palpitations and lightheadedness.  She  laid herself on the ground where she was helped by bystanders, never LOC. EMS was called, on arrival ECG performed by EMS revealing concerns for ST depression, SBP 70's in which patient received 300cc of fluids.  Pt. arrived to [facility] ER on a drip, dobutamine in which was discontinued.   On arrival to [facility] ER, ECG reveals no ST changes, NSR @99bpm with non specific ST-T wave changes and neg Troponin.   Pt. admits to similar near syncopal episode in  when she was first diagnosed with Polycythemia Vera.   She reports seeing her Hematologist, Dr. Nazario Hester, affiliated with [facility] one month ago and as told he was concerned due to a significant rise in her platelets and he would need to start new medication on next visit, 2021, if there was not a decrease in platelets.  Pt. denies fever, chills, HA, chest pain, SOB, myalgia, dizziness, diaphoresis, palpitations, abdominal discomfort and n/v/d.    In ER ECG reveals NSR@99bpm with non specific ST-T wave changes, Troponin neg X1, WBC 25.4, Neutrophil 87.1, H/H 12.4/43.6, Platelets 1064, K+ 3.3 (replenished with Potassium Chloride 40mEq PO X1) and COVID-19 non detected.  CT of head without contrast reveals no acute hemorrhage or infarct.  Hematology was consulted, Dr. Garcia evaluated patient and started patient on Hydroxyurea 500mg PO daily, and recommended type and screen, no Aspirin 2/2 to extreme thrombocytosis, rule out acquired von Willebrand disease.  He also recommends contacting her Hematologist, Dr. Nazario Hester, for collateral information.    In light of patient's risk factors, prior ECG changes and near syncope pt. is admitted to [facility] Cardiology for further monitoring.     (12 Sep 2021 21:51)    PAST MEDICAL & SURGICAL HISTORY:  H/O polycythemia vera  Hypertension  Hyperlipidemia  Near syncope    ALLERGIES:  No Known Allergies    MEDICATIONS:  STANDING MEDICATIONS  hydroxyurea 500 milliGRAM(s) Oral daily  influenza  Vaccine (HIGH DOSE) 0.7 milliLiter(s) IntraMuscular once    PRN MEDICATIONS    VITALS:   T(F): 97.6  HR: 72  BP: 119/57  RR: 18  SpO2: 97%    LABS:                        11.2   29.97 )-----------( 850      ( 13 Sep 2021 06:54 )             39.8         136  |  105  |  22  ----------------------------<  101<H>  4.2   |  22  |  0.96    Ca    8.2<L>      13 Sep 2021 06:54  Mg     1.6         TPro  5.5<L>  /  Alb  3.7  /  TBili  0.7  /  DBili  <0.2  /  AST  23  /  ALT  16  /  AlkPhos  90  -13    PT/INR - ( 13 Sep 2021 06:54 )   PT: 15.1 sec;   INR: 1.27       PTT - ( 13 Sep 2021 06:54 )  PTT:28.1 sec  Urinalysis Basic - ( 12 Sep 2021 19:56 )    Color: Yellow / Appearance: Clear / S.020 / pH: x  Gluc: x / Ketone: NEGATIVE  / Bili: Negative / Urobili: 0.2 E.U./dL   Blood: x / Protein: NEGATIVE mg/dL / Nitrite: NEGATIVE   Leuk Esterase: Small / RBC: < 5 /HPF / WBC > 10 /HPF   Sq Epi: x / Non Sq Epi: 0-5 /HPF / Bacteria: Present /HPF    Creatine Kinase, Serum: 42 U/L (21 @ 06:54)  Troponin T, Serum: 0.01 ng/mL (21 @ 06:54)  Creatine Kinase, Serum: 55 U/L (21 @ 19:56)  Troponin T, Serum: 0.02 ng/mL *H* (21 @ 19:56)  Creatine Kinase, Serum: 52 U/L (21 @ 16:24)  Troponin T, Serum: 0.01 ng/mL (21 @ 16:24)    CARDIAC MARKERS ( 13 Sep 2021 06:54 )  x     / 0.01 ng/mL / 42 U/L / x     / 4.8 ng/mL  CARDIAC MARKERS ( 12 Sep 2021 19:56 )  x     / 0.02 ng/mL / 55 U/L / x     / 4.2 ng/mL  CARDIAC MARKERS ( 12 Sep 2021 16:24 )  x     / 0.01 ng/mL / 52 U/L / x     / 3.1 ng/mL    RADIOLOGY:  Reviewed    PHYSICAL EXAM:  GEN: No acute distress  HEENT: NCAT  LUNGS: Clear to auscultation bilaterally   HEART: S1/S2 present. RRR.   ABD: Soft, non-tender, non-distended. Bowel sounds present  EXT: No pitting edema  NEURO: AAOX3

## 2021-09-13 NOTE — DISCHARGE NOTE NURSING/CASE MANAGEMENT/SOCIAL WORK - NSDCPEFALRISK_GEN_ALL_CORE
For information on Fall & injury Prevention, visit [email or web address]

## 2021-09-13 NOTE — DISCHARGE NOTE NURSING/CASE MANAGEMENT/SOCIAL WORK - PATIENT PORTAL LINK FT
You can access the FollowMyHealth Patient Portal offered by [facility] by registering at the following website: [email or web address]. By joining [facility] [employer]’s FollowMyHealth portal, you will also be able to view your health information using other applications (apps) compatible with our system.

## 2021-09-13 NOTE — DISCHARGE NOTE PROVIDER - REASON FOR ADMISSION
pre-syncope near-syncope Near Syncope  Pt. reports feeling  weak with one episode of palpitations and lightheadedness before slow fall no LOC Near Syncope

## 2021-09-13 NOTE — DISCHARGE NOTE PROVIDER - NSDCMRMEDTOKEN_GEN_ALL_CORE_FT
aspirin 81 mg oral tablet, chewable: 1 tab(s) orally once a day  hydroCHLOROthiazide 25 mg oral tablet: 1 tab(s) orally once a day  lisinopril 10 mg oral tablet: 1 tab(s) orally once a day  Zetia 10 mg oral tablet: 1 tab(s) orally once a day   aspirin 81 mg oral tablet, chewable: 1 tab(s) orally once a day  hydroCHLOROthiazide 25 mg oral tablet: 1 tab(s) orally once a day  hydroxyurea 500 mg oral capsule: 1 cap(s) orally once a day  lisinopril 10 mg oral tablet: 1 tab(s) orally once a day  Zetia 10 mg oral tablet: 1 tab(s) orally once a day

## 2021-09-13 NOTE — DISCHARGE NOTE PROVIDER - NSDCCPCAREPLAN_GEN_ALL_CORE_FT
PRINCIPAL DISCHARGE DIAGNOSIS  Diagnosis: Syncope  Assessment and Plan of Treatment: You were admitted to the hospital because you almost lost consciousness. The CT Scan of your brain was without acute bleeding. Your heart enzymes (markers of heart injury in the blood) were negative. Your Echocardiogram (heart Ultrasound) showed normal wall motion. You had a CT of your heart arterires that showed no blockages in the arteries of the heart. The heart monitor did not show any signs of arrythmias or irregular heart beat that could cause you to pass out.  Your symptoms were likely due to your history of Polycythemia Vera as well as your blood pressure being low from being dehydrated. Please check your blood pressure daily and if it is less than 110/80, do not take your Lisinopril or Hydrochlorothiazide. It is very important that you remain hydrated to prevent low blood pressure.  If you experience any worsening lightheadedness, dizziness, loss of conciousness, chest pain, palpiations or shortness of breath, please return to the emergency room.      SECONDARY DISCHARGE DIAGNOSES  Diagnosis: Polycythemia vera  Assessment and Plan of Treatment: While you were in the hospital, your Platelets were elevated at 1064 and improved to 850 prior to being discharged. Please START Hydroxyurea 500mg daily and follow up with your Hematologist Dr. Hester on 9/20/21, his office will call you with the appointment time.

## 2022-09-18 NOTE — PHYSICAL THERAPY INITIAL EVALUATION ADULT - MD/RN NOTIFIED
"Subjective   History of Present Illness  Patient presents emergency department from the nursing home with chest pain that began \"sometime during the night.\"  She denies a history of coronary artery disease and smoking.  She does have a history of hypertension, hypercholesterolemia, and diabetes mellitus.    Chest Pain  Pain location:  Substernal area  Pain quality: aching    Pain radiates to:  Upper back and L arm  Pain severity:  Moderate  Timing:  Intermittent  Progression:  Waxing and waning  Chronicity:  New  Relieved by:  Nothing  Worsened by:  Nothing  Associated symptoms: no abdominal pain, no cough, no diaphoresis, no dizziness, no dysphagia, no fatigue, no fever, no headache, no nausea, no shortness of breath, no vomiting and no weakness    Risk factors: diabetes mellitus, high cholesterol, hypertension and obesity    Risk factors: no smoking        Review of Systems   Constitutional: Negative for appetite change, chills, diaphoresis, fatigue and fever.   HENT: Negative for congestion, ear discharge, ear pain, nosebleeds, rhinorrhea, sinus pressure, sore throat and trouble swallowing.    Eyes: Negative for discharge and redness.   Respiratory: Negative for apnea, cough, chest tightness, shortness of breath and wheezing.    Cardiovascular: Positive for chest pain.   Gastrointestinal: Negative for abdominal pain, diarrhea, nausea and vomiting.   Endocrine: Negative for polyuria.   Genitourinary: Negative for dysuria, frequency and urgency.   Musculoskeletal: Negative for myalgias and neck pain.   Skin: Negative for color change and rash.   Allergic/Immunologic: Negative for immunocompromised state.   Neurological: Negative for dizziness, seizures, syncope, weakness, light-headedness and headaches.   Hematological: Negative for adenopathy. Does not bruise/bleed easily.   Psychiatric/Behavioral: Negative for behavioral problems and confusion.   All other systems reviewed and are negative.      Past Medical " "History:   Diagnosis Date   • Cancer (HCC) 2009    leukemia   • Coronary artery disease    • Diabetes mellitus (HCC)    • Hypertension    • Injury of back    • Myocardial infarction (HCC)    • Stroke (HCC)        Allergies   Allergen Reactions   • Atorvastatin      Memory   • Levofloxacin    • Meclizine    • Morphine        Past Surgical History:   Procedure Laterality Date   • CATARACT EXTRACTION     • TRIGEMINAL NERVE DECOMPRESSION     • TUBAL ABDOMINAL LIGATION     • UVULOPALATOPHARYNGOPLASTY         No family history on file.    Social History     Socioeconomic History   • Marital status:    Tobacco Use   • Smoking status: Never Smoker   • Smokeless tobacco: Never Used   Substance and Sexual Activity   • Alcohol use: No   • Drug use: No   • Sexual activity: Never       BP (!) 189/91 (BP Location: Right arm, Patient Position: Lying)   Pulse 72   Temp 98.4 °F (36.9 °C) (Oral)   Resp 20   Ht 157.5 cm (62\")   Wt 100 kg (221 lb)   SpO2 94%   BMI 40.42 kg/m²     Objective   Physical Exam  Vitals and nursing note reviewed.   Constitutional:       Appearance: She is well-developed.   HENT:      Head: Normocephalic and atraumatic.      Nose: Nose normal.   Eyes:      General: No scleral icterus.        Right eye: No discharge.         Left eye: No discharge.      Conjunctiva/sclera: Conjunctivae normal.      Pupils: Pupils are equal, round, and reactive to light.   Neck:      Trachea: No tracheal deviation.   Cardiovascular:      Rate and Rhythm: Normal rate and regular rhythm.      Heart sounds: Normal heart sounds. No murmur heard.  Pulmonary:      Effort: Pulmonary effort is normal. No respiratory distress.      Breath sounds: Normal breath sounds. No stridor. No wheezing or rales.   Abdominal:      General: Bowel sounds are normal. There is no distension.      Palpations: Abdomen is soft. There is no mass.      Tenderness: There is no abdominal tenderness. There is no guarding or rebound. "   Musculoskeletal:      Cervical back: Normal range of motion and neck supple.   Skin:     General: Skin is warm and dry.      Findings: No erythema or rash.   Neurological:      Mental Status: She is alert and oriented to person, place, and time.      Coordination: Coordination normal.   Psychiatric:         Behavior: Behavior normal.         Thought Content: Thought content normal.         Procedures           ED Course  ED Course as of 09/18/22 0708   Sun Sep 18, 2022   0702 Dr. Wild came in and saw patient and most likely will take patient to Cath Lab. [MO]   0707 Spoke to Dr. Will who accepted patient. [MO]      ED Course User Index  [MO] Phuc Leija MD            Labs Reviewed   TROPONIN (IN-HOUSE) - Abnormal; Notable for the following components:       Result Value    Troponin T 0.471 (*)     All other components within normal limits    Narrative:     Troponin T Reference Range:  <= 0.03 ng/mL-   Negative for AMI  >0.03 ng/mL-     Abnormal for myocardial necrosis.  Clinicians would have to utilize clinical acumen, EKG, Troponin and serial changes to determine if it is an Acute Myocardial Infarction or myocardial injury due to an underlying chronic condition.       Results may be falsely decreased if patient taking Biotin.     COMPREHENSIVE METABOLIC PANEL - Abnormal; Notable for the following components:    Glucose 120 (*)     Creatinine 1.20 (*)     CO2 30.0 (*)     AST (SGOT) 54 (*)     Alkaline Phosphatase 147 (*)     eGFR 45.3 (*)     All other components within normal limits    Narrative:     GFR Normal >60  Chronic Kidney Disease <60  Kidney Failure <15     BNP (IN-HOUSE) - Abnormal; Notable for the following components:    proBNP 2,892.0 (*)     All other components within normal limits    Narrative:     Among patients with dyspnea, NT-proBNP is highly sensitive for the detection of acute congestive heart failure. In addition NT-proBNP of <300 pg/ml effectively rules out acute congestive  heart failure with 99% negative predictive value.    Results may be falsely decreased if patient taking Biotin.     CBC WITH AUTO DIFFERENTIAL - Abnormal; Notable for the following components:    WBC 11.19 (*)     Neutrophils, Absolute 7.27 (*)     Monocytes, Absolute 0.96 (*)     Immature Grans, Absolute 0.06 (*)     All other components within normal limits   CK - Abnormal; Notable for the following components:    Creatine Kinase 391 (*)     All other components within normal limits   LIPASE - Normal   MAGNESIUM - Normal   RAINBOW DRAW    Narrative:     The following orders were created for panel order [facility] Draw.  Procedure                               Abnormality         Status                     ---------                               -----------         ------                     Green Top (Gel)[[id number]]                                  In process                 Lavender Top[[id number]]                                     In process                 Gold Top - SST[[id number]]                                   In process                 Light Blue Top[[id number]]                                   In process                   Please view results for these tests on the individual orders.   TROPONIN (IN-HOUSE)   CBC AND DIFFERENTIAL    Narrative:     The following orders were created for panel order CBC & Differential.  Procedure                               Abnormality         Status                     ---------                               -----------         ------                     CBC Auto Differential[[id number]]        Abnormal            Final result                 Please view results for these tests on the individual orders.   GREEN TOP   LAVENDER TOP   GOLD TOP - SST   LIGHT BLUE TOP       XR Chest 1 View   Final Result      No active disease by portable imaging.      Electronically signed by:  Chase Moore MD  9/18/2022 6:52 AM   CDT Workstation: [id number]                                         MDM    Final diagnoses:   Non-STEMI (non-ST elevated myocardial infarction) (HCC)       ED Disposition  ED Disposition     ED Disposition   Decision to Admit    Condition   --    Comment   Level of Care: Telemetry [5]   Diagnosis: Non-STEMI (non-ST elevated myocardial infarction) (HCC) [333369]   Admitting Physician: ALMAZ OCAMPO [[id number]]   Attending Physician: ALMAZ OCAMPO [[id number]]   Isolate for COVID?: No [0]   Certification: I Certify That Inpatient Hospital Services Are Medically Necessary For Greater Than 2 Midnights               No follow-up provider specified.       Medication List      No changes were made to your prescriptions during this visit.          Phuc Leija MD  09/18/22 0705       Phuc Leija MD  09/18/22 0708     yes

## 2024-08-30 NOTE — ED ADULT NURSE NOTE - NSINTERVENTIONOPT_GEN_ALL_ED
Medical Necessity Information: It is in your best interest to select a reason for this procedure from the list below. All of these items fulfill various CMS LCD requirements except the new and changing color options. Show Aperture Variable?: Yes Spray Paint Technique: No Detail Level: Detailed Spray Paint Text: The liquid nitrogen was applied to the skin utilizing a spray paint frosting technique. Post-Care Instructions: I reviewed with the patient in detail post-care instructions. Patient is to wear sunprotection, and avoid picking at any of the treated lesions. Pt may apply Vaseline to crusted or scabbing areas. Consent: The patient's consent was obtained including but not limited to risks of crusting, scabbing, blistering, scarring, darker or lighter pigmentary change, recurrence, incomplete removal and infection. Hourly Rounding

## 2025-04-01 NOTE — DISCHARGE NOTE PROVIDER - NSDCQMSTAIRS_GEN_ALL_CORE
Addended by: DORYS LLANOS on: 4/1/2025 02:27 PM     Modules accepted: Orders    
Addended by: ISA LANDAVERDE on: 4/1/2025 01:53 PM     Modules accepted: Orders    
No